# Patient Record
Sex: FEMALE | Race: WHITE | Employment: STUDENT | URBAN - METROPOLITAN AREA
[De-identification: names, ages, dates, MRNs, and addresses within clinical notes are randomized per-mention and may not be internally consistent; named-entity substitution may affect disease eponyms.]

---
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
## 2020-09-03 DIAGNOSIS — Z11.59 SPECIAL SCREENING EXAMINATION FOR VIRAL DISEASE: ICD-10-CM

## 2020-09-04 LAB
COVID-19 ANTIBODY IGG: NEGATIVE
LAB TEST METHOD: NORMAL
SARS-COV-2 RNA SPEC QL NAA+PROBE: NOT DETECTED
SPECIMEN SOURCE: NORMAL

## 2020-09-05 DIAGNOSIS — Z11.59 SPECIAL SCREENING EXAMINATION FOR VIRAL DISEASE: ICD-10-CM

## 2020-09-06 LAB
SARS-COV-2 RNA SPEC QL NAA+PROBE: NOT DETECTED
SPECIMEN SOURCE: NORMAL

## 2021-09-08 DIAGNOSIS — Z13.6 SCREENING FOR HEART DISEASE: ICD-10-CM

## 2021-09-09 DIAGNOSIS — R94.31 ABNORMAL EKG: Primary | ICD-10-CM

## 2021-09-10 ENCOUNTER — DOCUMENTATION ONLY (OUTPATIENT)
Dept: FAMILY MEDICINE | Facility: CLINIC | Age: 21
End: 2021-09-10

## 2021-09-10 LAB
ATRIAL RATE - MUSE: 107 BPM
DIASTOLIC BLOOD PRESSURE - MUSE: NORMAL MMHG
INTERPRETATION ECG - MUSE: NORMAL
P AXIS - MUSE: 58 DEGREES
PR INTERVAL - MUSE: 152 MS
QRS DURATION - MUSE: 86 MS
QT - MUSE: 348 MS
QTC - MUSE: 464 MS
R AXIS - MUSE: 66 DEGREES
SYSTOLIC BLOOD PRESSURE - MUSE: NORMAL MMHG
T AXIS - MUSE: 40 DEGREES
VENTRICULAR RATE- MUSE: 107 BPM

## 2021-09-17 ENCOUNTER — HOSPITAL ENCOUNTER (OUTPATIENT)
Dept: CARDIOLOGY | Facility: CLINIC | Age: 21
Discharge: HOME | End: 2021-09-17
Attending: FAMILY MEDICINE | Admitting: FAMILY MEDICINE
Payer: COMMERCIAL

## 2021-09-17 DIAGNOSIS — R94.31 ABNORMAL EKG: ICD-10-CM

## 2021-09-17 LAB — LVEF ECHO: NORMAL

## 2021-09-17 PROCEDURE — 93306 TTE W/DOPPLER COMPLETE: CPT | Mod: 26 | Performed by: INTERNAL MEDICINE

## 2021-09-17 PROCEDURE — 93306 TTE W/DOPPLER COMPLETE: CPT

## 2021-10-19 ENCOUNTER — HOSPITAL ENCOUNTER (EMERGENCY)
Facility: CLINIC | Age: 21
Discharge: HOME | End: 2021-10-20
Attending: EMERGENCY MEDICINE | Admitting: EMERGENCY MEDICINE
Payer: COMMERCIAL

## 2021-10-19 ENCOUNTER — APPOINTMENT (OUTPATIENT)
Dept: CT IMAGING | Facility: CLINIC | Age: 21
End: 2021-10-19
Attending: EMERGENCY MEDICINE
Payer: COMMERCIAL

## 2021-10-19 DIAGNOSIS — S00.12XA CONTUSION OF LEFT EYELID, INITIAL ENCOUNTER: ICD-10-CM

## 2021-10-19 DIAGNOSIS — S01.01XA LACERATION OF SCALP, INITIAL ENCOUNTER: ICD-10-CM

## 2021-10-19 LAB
HCG UR QL: NEGATIVE
INTERNAL QC OK POCT: NORMAL

## 2021-10-19 PROCEDURE — 81025 URINE PREGNANCY TEST: CPT | Performed by: EMERGENCY MEDICINE

## 2021-10-19 PROCEDURE — 70486 CT MAXILLOFACIAL W/O DYE: CPT | Mod: 26 | Performed by: RADIOLOGY

## 2021-10-19 PROCEDURE — 99284 EMERGENCY DEPT VISIT MOD MDM: CPT | Mod: 25 | Performed by: EMERGENCY MEDICINE

## 2021-10-19 PROCEDURE — 70486 CT MAXILLOFACIAL W/O DYE: CPT

## 2021-10-19 PROCEDURE — 12011 RPR F/E/E/N/L/M 2.5 CM/<: CPT | Performed by: EMERGENCY MEDICINE

## 2021-10-19 ASSESSMENT — MIFFLIN-ST. JEOR: SCORE: 1556.94

## 2021-10-19 NOTE — LETTER
October 20, 2021      To Whom It May Concern:      Viola Beach was seen in our Emergency Department today, 10/20/21.  I expect her condition to improve over the next 1-2 days.  She may return to work/school when improved.    Sincerely,        Jigar Howard MD

## 2021-10-20 VITALS
OXYGEN SATURATION: 99 % | TEMPERATURE: 98.9 F | HEIGHT: 68 IN | BODY MASS INDEX: 25.01 KG/M2 | SYSTOLIC BLOOD PRESSURE: 141 MMHG | HEART RATE: 99 BPM | WEIGHT: 165 LBS | DIASTOLIC BLOOD PRESSURE: 100 MMHG | RESPIRATION RATE: 16 BRPM

## 2021-10-20 PROCEDURE — 250N000009 HC RX 250: Performed by: EMERGENCY MEDICINE

## 2021-10-20 RX ADMIN — LIDOCAINE HYDROCHLORIDE 10 ML: 10 INJECTION, SOLUTION EPIDURAL; INFILTRATION; INTRACAUDAL; PERINEURAL at 01:00

## 2021-10-20 NOTE — ED PROVIDER NOTES
"    [place] EMERGENCY DEPARTMENT ([facility])  10/19/21    History     Chief Complaint   Patient presents with    Fall    Laceration     The history is provided by the patient and medical records.     Viola Beach is a 21 year old female who presents to the Emergency Department with facial laceration. Patient reports she was walking from her roommates room back to her room in the dark. She states she must have been too close to the stairs because she took 1 step and then fell down about 10 stairs. She denies loss of consciousness. Patient sustained laceration to left eyebrow. She states she also has some rug burns to her legs. She denies neck or back pain. Per records review, patient's last tdap was in 2011.  Declines tetanus booster at this time.    Past Medical History  No past medical history on file.  Past Surgical History:   Procedure Laterality Date    AS REPAIR CRUCIATE LIGAMENT,KNEE Left 12/12/2017     norethindrone-ethinyl estradiol (JUNEL FE 1/20) 1-20 MG-MCG per tablet      Allergies   Allergen Reactions    Amoxicillin     Mold      Family History  No family history on file.  Social History   Social History     Tobacco Use    Smoking status: Never Smoker   Substance Use Topics    Alcohol use: No    Drug use: No      Past medical history, past surgical history, medications, allergies, family history, and social history were reviewed with the patient. No additional pertinent items.       Review of Systems  A complete review of systems was performed with pertinent positives and negatives noted in the HPI, and all other systems negative.    Physical Exam   BP: (!) 151/99  Pulse: 97  Temp: 98.8  F (37.1  C)  Resp: 16  Height: 172.7 cm (5' 8\")  Weight: 74.8 kg (165 lb)  SpO2: 99 %  Physical Exam  Constitutional:       General: She is not in acute distress.     Appearance: Normal appearance.   HENT:      Head: Normocephalic.      Comments: 2-1/2 cm laceration over the left eyebrow left lower cheek " bruising swelling and skin abrasions     Right Ear: Tympanic membrane normal.      Left Ear: Tympanic membrane normal.      Ears:      Comments: No septal hematoma no hemotympanum no fletcher sign     Nose: Nose normal.      Mouth/Throat:      Mouth: Mucous membranes are moist.   Eyes:      Extraocular Movements: Extraocular movements intact.      Pupils: Pupils are equal, round, and reactive to light.   Cardiovascular:      Rate and Rhythm: Normal rate and regular rhythm.      Pulses: Normal pulses.   Pulmonary:      Effort: Pulmonary effort is normal.      Breath sounds: Normal breath sounds.   Abdominal:      General: Abdomen is flat.      Palpations: Abdomen is soft.      Tenderness: There is no abdominal tenderness. There is no guarding or rebound.   Musculoskeletal:         General: No tenderness or deformity. Normal range of motion.      Cervical back: Normal range of motion and neck supple. No tenderness.      Comments: Rug burn superficial abrasion to bilateral shins   Skin:     General: Skin is warm.   Neurological:      General: No focal deficit present.      Mental Status: She is alert and oriented to person, place, and time.      Cranial Nerves: No cranial nerve deficit.      Motor: No weakness.         ED Course   10:36 PM  The patient was seen and examined by Jigar Howard MD in Room ED17[facility]    -Laceration Repair    Date/Time: 10/20/2021 1:26 AM  Performed by: Jigar Howard MD  Authorized by: Jigar Howard MD       ANESTHESIA (see MAR for exact dosages):     Anesthesia method:  Local infiltration    Local anesthetic:  Lidocaine 1% w/o epi  LACERATION DETAILS     Location:  Face    Face location:  L eyebrow    Length (cm):  2.5    REPAIR TYPE:     Repair type:  Simple      EXPLORATION:     Wound exploration: wound explored through full range of motion and entire depth of wound probed and visualized      Contaminated: no      TREATMENT:     Area  cleansed with:  Saline    Amount of cleaning:  Standard    SKIN REPAIR     Repair method:  Sutures    Suture size:  5-0    Suture material:  Nylon    Number of sutures:  4    APPROXIMATION     Approximation:  Close    POST-PROCEDURE DETAILS     Dressing:  Antibiotic ointment and sterile dressing      PROCEDURE   Patient Tolerance:  Patient tolerated the procedure well with no immediate complications                 Results for orders placed or performed during the hospital encounter of 10/19/21   CT Facial Bones without Contrast     Status: None    Narrative    EXAM: CT FACIAL BONES WITHOUT CONTRAST  LOCATION: [facility]  DATE/TIME: 10/19/2021 11:31 PM    INDICATION: Facial injury  COMPARISON: None.  TECHNIQUE: Routine CT Maxillofacial without IV contrast. Multiplanar reformats. Dose reduction techniques were used.     FINDINGS:  OSSEOUS STRUCTURES/SOFT TISSUES: Soft tissue swelling/edema overlying left cheek, left orbit, left frontal bone, and left temporal region.. No facial bone fracture or malalignment. No evidence for dental trauma or periapical abscess.    ORBITAL CONTENTS: No acute abnormality.    SINUSES: No paranasal sinus mucosal disease.    VISUALIZED INTRACRANIAL CONTENTS: No acute abnormality.       Impression    IMPRESSION:   1.  No definite fracture.     hCG qual urine POCT     Status: Normal   Result Value Ref Range    HCG Qual Urine Negative Negative    Internal QC Check POCT Valid Valid     Medications   lidocaine 1 % 10 mL (has no administration in time range)        Assessments & Plan (with Medical Decision Making)   Patient presents for a fall downstairs with left-sided facial trauma and laceration of the left eyebrow.  She denies any LOC.  Denies any other aches or pains has been able to ambulate.  No nausea or vomiting.  Exam shows obvious trauma to the left side of the face cheek and eye extraocular motions are intact vision is intact.  CT imaging  does not show any signs for fracture.  The rest of her exam is benign for trauma she is able to ambulate move all extremities no gross deformity.  Laceration was repaired with sutures.  Discussed removal in 5 to 7 days.  Discussed signs symptoms to return.  Patient understands the plan and is discharged in stable condition.    I have reviewed the nursing notes. I have reviewed the findings, diagnosis, plan and need for follow up with the patient.    New Prescriptions    No medications on file       Final diagnoses:   Contusion of left eyelid, initial encounter   Laceration of scalp, initial encounter     I, Angelica Antunez, am serving as a trained medical scribe to document services personally performed by Jigar Howard MD, based on the provider's statements to me.      I, Jigar Howard MD, was physically present and have reviewed and verified the accuracy of this note documented by Angelica Antunez.     --  Jigar Howard MD  [facility] EMERGENCY DEPARTMENT  10/19/2021     Jigar Howard MD  10/20/21 0128    Final diagnosis left eyebrow laceration       Jigar Howard MD  11/08/21 [id number]

## 2021-10-20 NOTE — DISCHARGE INSTRUCTIONS
Please make an appointment to [facility] (phone: [phone]) for suture removal in 5-7 days.

## 2021-10-20 NOTE — ED TRIAGE NOTES
Patient presented to the ER after a fall down 10 stairs. Above eye brow laceration above left eye, facial hematoma, patient states shins have rug burn. VSS, A/OX4, able to communicate needs.

## 2021-10-21 ENCOUNTER — OFFICE VISIT (OUTPATIENT)
Dept: FAMILY MEDICINE | Facility: CLINIC | Age: 21
End: 2021-10-21
Payer: COMMERCIAL

## 2021-10-21 VITALS
HEART RATE: 99 BPM | DIASTOLIC BLOOD PRESSURE: 96 MMHG | WEIGHT: 184 LBS | SYSTOLIC BLOOD PRESSURE: 158 MMHG | BODY MASS INDEX: 27.89 KG/M2 | HEIGHT: 68 IN

## 2021-10-21 DIAGNOSIS — S06.0X0A CONCUSSION WITHOUT LOSS OF CONSCIOUSNESS, INITIAL ENCOUNTER: Primary | ICD-10-CM

## 2021-10-21 ASSESSMENT — MIFFLIN-ST. JEOR: SCORE: 1643.12

## 2021-10-21 NOTE — PROGRESS NOTES
"[facility]            SUBJECTIVE:     Viola Beach is a 21 year old female  presenting to clinic today for concussion evaluation.    Patient suffered a concussion on 10/19/21 after falling down the stairs while trying to navigate through her house in the dark. She sustained a facial laceration which was repaired in the ED. CT scan of the head did not reveal any intracranial bleeding or fractures.     SCAT 5 completed by ATC shows symptom score of 4, normal BES,         PMH, Medications and Allergies were reviewed and updated as needed.    ROS:  As noted above otherwise negative.    There is no problem list on file for this patient.      Current Outpatient Medications   Medication Sig Dispense Refill     norethindrone-ethinyl estradiol (JUNEL FE 1/20) 1-20 MG-MCG per tablet Take 1 tablet by mouth daily (Patient not taking: Reported on 10/21/2021) 84 tablet 1              OBJECTIVE:     Vitals:   Vitals:    10/21/21 1106   Height: 1.727 m (5' 8\")     BMI: Body mass index is 25.09 kg/m .    Gen:  Well nourished and in no acute distress    VOMS              ASSESSMENT & PLAN:      Viola was seen today for head injury.    Diagnoses and all orders for this visit:    Concussion without loss of consciousness, initial encounter          Options for treatment and/or follow-up care were reviewed with the patient and her ATC. Patient was actively involved in the decision making process. Patient verbalized understanding and was in agreement with the plan.    The patient was discussed with Dr.Suzanne BRUCE Flores MD, CAQ, CCD    Mark Tee DO PGY-4  [facility] Sports Medicine Fellow 2021-22      "

## 2021-10-21 NOTE — LETTER
"  10/21/2021      RE: Viola Beach  [address]  [facility] [address]       [facility]            SUBJECTIVE:     Viola Beach is a 21 year old female  presenting to clinic today for concussion evaluation.    Patient suffered a concussion on 10/19/21 after falling down the stairs while trying to navigate through her house in the dark. She sustained a facial laceration which was repaired in the ED. CT scan of the head did not reveal any intracranial bleeding or fractures.     SCAT 5 completed by ATC shows symptom score of 4, normal BES,         PMH, Medications and Allergies were reviewed and updated as needed.    ROS:  As noted above otherwise negative.    There is no problem list on file for this patient.      Current Outpatient Medications   Medication Sig Dispense Refill     norethindrone-ethinyl estradiol (JUNEL FE 1/20) 1-20 MG-MCG per tablet Take 1 tablet by mouth daily (Patient not taking: Reported on 10/21/2021) 84 tablet 1              OBJECTIVE:     Vitals:   Vitals:    10/21/21 1106   Height: 1.727 m (5' 8\")     BMI: Body mass index is 25.09 kg/m .    Gen:  Well nourished and in no acute distress    VOMS              ASSESSMENT & PLAN:      Viola was seen today for head injury.    Diagnoses and all orders for this visit:    Concussion without loss of consciousness, initial encounter          Options for treatment and/or follow-up care were reviewed with the patient and her ATC. Patient was actively involved in the decision making process. Patient verbalized understanding and was in agreement with the plan.    The patient was discussed with Dr.Suzanne BRUCE Flores MD, CAQ, CCD    Mark Tee DO PGY-4  [facility] Sports Medicine Fellow 2021-22        Attending Note:   I have discussed this patient and have reviewed the clinical presentation and progress note with the fellow. I agree with the treatment plan as outlined. The plan was " formulated with the fellow on the day of the patient's visit.   Cris Flores MD, CAQ, CCD  [facility]  Sports Medicine and Bone Health      Mark Tee MD

## 2021-10-26 ENCOUNTER — DOCUMENTATION ONLY (OUTPATIENT)
Dept: FAMILY MEDICINE | Facility: CLINIC | Age: 21
End: 2021-10-26

## 2021-10-27 NOTE — PROGRESS NOTES
"[facility] ATHLETICS  Elenita ATC initial assessment note  Date of service performed: 10/20/2021    Concern: Head injury/concussion   Description: ER visit  Date of injury: 10/19/2021     S: Viola fell down a flight of stairs in her apartment last night resulting in a visit to the Emergency Room for sutures over her left eye and assessment for further brain injury.     O: Viola's left side of her face is bruised and swollen. She has 4 sutures in her left eyebrow. SCAT5: Orientation 5/5, Immediate Memory 12/15, Concentration 3/5, Delayed Recall 5/5; Normal neuro exam; Balance 0/30 errors; 4 total symptoms; Symptom severity = 4 (1 for \"don't feel right\", difficulty concentrating, fatigue and trouble falling asleep).    A: Possible concussion    P: Viola will follow up with a team physician for further assessment of a concussion and complete an ImPACT test when symptoms begin to resolve.    Jessica Erickson, ATC      "

## 2021-10-29 NOTE — PROGRESS NOTES
[facility] ATHLETICS  Elenita ATC follow-up note  Date of service performed: 10/27/2021    Concern/injury: Head injury/concussion    Assessment/plan: Due to a decrease in symptoms, Viola will do an erg session today for 50 minutes to increase HR (120-160 bpm) per the [facility] Concussion RTP progression plan.    Symptom score: 0 (before and after exertion)    Jessica Erickson ATC

## 2021-10-29 NOTE — PROGRESS NOTES
[facility] ATHLET[facility]  Elenita ATC follow-up note  Date of service performed: 10/28/2021    Concern/injury: Head injury/concussion    Assessment/plan: Viola participated in a lift session today per the [facility] Concussion RTP progression plan.    Symptom score: 0 (before and after exertion)    Jessica Erickson ATC

## 2021-10-29 NOTE — PROGRESS NOTES
[facility] ATHLETICS  Elenita ATC follow-up note  Date of service performed: 10/25/2021    Concern/injury: Head injury/concussion    Assessment/plan: Viola took her ImPACT test today. Reaction time is still much slower than baseline. Will repeat ImPACT before full clearance per Dr. Tee.    Symptom score: 0    Jessica Erickson, ATC

## 2021-10-29 NOTE — PROGRESS NOTES
[facility] ATHLETICS  Elenita ATC follow-up note  Date of service performed: 10/26/2021    Concern/injury: Head injury/concussion    Assessment/plan: Due to a decrease in symptoms, Viola will do a light erg today for 20 minutes to increase HR (120-160 bpm) per the [facility] Concussion RTP progression plan.    Symptom score: 0 (before and after exertion)    Jessica Erickson ATC

## 2021-10-29 NOTE — PROGRESS NOTES
[facility] ATHLETICS  Elenita ATC follow-up note  Date of service performed: 10/22/2021    Concern/injury: Head injury/concussion    Assessment/plan: Due to a decrease in symptoms, Viola will do a light bike today for 20 minutes to increase HR (120-160 bpm).    Symptom score: 0 (before and after exertion)    Jessica Erickson, ATC

## 2021-11-01 NOTE — PROGRESS NOTES
[facility] ATHLETICS  Elenita ATC follow-up note  Date of service performed: 10/29/2021     Concern/injury: Head injury/concussion     Assessment/plan: Viola participated in a non-contact full practice session today per the [facility] Concussion RTP progression plan.     Symptom score: 0 (before and after exertion)    Jessica Erickson ATC

## 2021-11-01 NOTE — PROGRESS NOTES
Attending Note:   I have discussed this patient and have reviewed the clinical presentation and progress note with the fellow. I agree with the treatment plan as outlined. The plan was formulated with the fellow on the day of the patient's visit.   Cris Flores MD, CAQ, CCD  [facility]  Sports Medicine and Bone Health

## 2022-01-29 ENCOUNTER — HEALTH MAINTENANCE LETTER (OUTPATIENT)
Age: 22
End: 2022-01-29

## 2022-09-18 ENCOUNTER — HEALTH MAINTENANCE LETTER (OUTPATIENT)
Age: 22
End: 2022-09-18

## 2023-05-07 ENCOUNTER — HEALTH MAINTENANCE LETTER (OUTPATIENT)
Age: 23
End: 2023-05-07

## 2024-07-14 ENCOUNTER — HEALTH MAINTENANCE LETTER (OUTPATIENT)
Age: 24
End: 2024-07-14